# Patient Record
Sex: FEMALE | Race: BLACK OR AFRICAN AMERICAN | NOT HISPANIC OR LATINO | Employment: FULL TIME | URBAN - METROPOLITAN AREA
[De-identification: names, ages, dates, MRNs, and addresses within clinical notes are randomized per-mention and may not be internally consistent; named-entity substitution may affect disease eponyms.]

---

## 2019-05-14 ENCOUNTER — SSC ENCOUNTER (OUTPATIENT)
Dept: ADMINISTRATIVE | Facility: OTHER | Age: 39
End: 2019-05-14

## 2019-05-14 NOTE — PROGRESS NOTES
OPCM referral has been sent to the low/moderate OPCM team for screening.     Reason for referral: Pt states she has free care and need New Mirena IUD to be placed.    Patient does not have [facility]    Carolann Harley    Outpatient Case Management

## 2019-05-16 ENCOUNTER — OUTPATIENT CASE MANAGEMENT (OUTPATIENT)
Dept: ADMINISTRATIVE | Facility: OTHER | Age: 39
End: 2019-05-16

## 2019-05-16 NOTE — PROGRESS NOTES
This LMSW received a referral to assist with free care and need New Mirena IUD to be placed. LMSW contacted [facility] to verify procedure for financial assistance. Shyla with [facility] [employer] [phone] option 2 advised this [facility] patient will need to apply for assistance. Shyla reports patient may use a financial assistance application to assist with GYN procedure. Shyla reports decisions take at least 7-10 business days to be approved or denied. LMSW attempt to reach patient no answer. LMSW left a detail  Message for a return call.       2019 Darlene GOLDMAN confirmed with supervisor from [email or web address] . Supervisor reports  patient indeed has free care. Supervisor reports free care will  2020

## 2019-05-16 NOTE — LETTER
May 20, 2019    Ina Hogue  [address]  [place] [address]             [facility] [address]  [place] [address] Dear: Ina Hogue     I am writing from the Outpatient Complex Care Management Department at [facility].  I received a referral from TAMIKO Gifford to contact regarding any needs you may have. I have attempted to contact you  by phone two times unsuccessfully.  Please contact the Outpatient Complex Care Management Department at [phone] if you would like to discuss your needs.      Sincerely,         Anisha Macario LMSW

## 2019-05-21 ENCOUNTER — OUTPATIENT CASE MANAGEMENT (OUTPATIENT)
Dept: ADMINISTRATIVE | Facility: OTHER | Age: 39
End: 2019-05-21

## 2019-05-21 NOTE — PROGRESS NOTES
LMSW attempt to follow up with patient regarding the affordability of IUD. No answer. LMSW left a message for a return call.     LMSW received a return call back from patient. Patient reports the last IUD was covered through [employer]. Patient reports she no longer has this insurance. LMSW encouraged patient to complete application for the [employer]. This Foundation assist with affordability of IUD. LMSW provided patient with the link to apply for assistance. [email or web address]

## 2019-07-02 PROBLEM — N83.299 COMPLEX OVARIAN CYST: Status: ACTIVE | Noted: 2019-07-02

## 2019-07-02 PROBLEM — D21.9 FIBROID: Status: ACTIVE | Noted: 2019-07-02

## 2019-08-16 PROBLEM — Z30.430 ENCOUNTER FOR INSERTION OF MIRENA IUD: Status: ACTIVE | Noted: 2019-08-16

## 2019-08-26 PROBLEM — G89.29 CHRONIC PAIN OF LEFT KNEE: Status: ACTIVE | Noted: 2019-08-26

## 2019-08-26 PROBLEM — J30.1 SEASONAL ALLERGIC RHINITIS DUE TO POLLEN: Status: ACTIVE | Noted: 2019-08-26

## 2019-08-26 PROBLEM — M25.562 CHRONIC PAIN OF LEFT KNEE: Status: ACTIVE | Noted: 2019-08-26

## 2019-08-26 PROBLEM — F41.9 ANXIETY: Status: ACTIVE | Noted: 2019-08-26

## 2019-12-04 PROBLEM — Z13.220 SCREENING CHOLESTEROL LEVEL: Status: ACTIVE | Noted: 2019-08-16

## 2020-01-29 PROBLEM — N83.299 COMPLEX OVARIAN CYST: Status: RESOLVED | Noted: 2019-07-02 | Resolved: 2020-01-29

## 2020-03-09 PROBLEM — Z13.220 SCREENING CHOLESTEROL LEVEL: Status: RESOLVED | Noted: 2019-08-16 | Resolved: 2020-03-09

## 2020-04-30 PROBLEM — R10.2 PELVIC PAIN: Status: ACTIVE | Noted: 2020-04-30

## 2021-05-12 ENCOUNTER — PATIENT MESSAGE (OUTPATIENT)
Dept: RESEARCH | Facility: HOSPITAL | Age: 41
End: 2021-05-12

## 2023-05-13 DIAGNOSIS — U07.1 COVID-19 VIRUS DETECTED: ICD-10-CM
